# Patient Record
(demographics unavailable — no encounter records)

---

## 2025-02-25 NOTE — ASSESSMENT
[FreeTextEntry1] : 1) Onychomycosis with elongated ingrown toenails with pain: aseptic debridement of elongated mycotic ingrown toenails x 2, continue topical antifungal 2) Hammertoes: discussed use of proper fit high toe box shoes with accommodative insoles, check feet for any new lesions daily, seek treatment immediately if present  Patient tolerated all treatments well and without any complications [Verbal] : verbal [Patient] : patient [Good - alert, interested, motivated] : Good - alert, interested, motivated [Verbalizes knowledge/Understanding] : Verbalizes knowledge/understanding [Pt responsibility to plan of care] : patient responsibility to plan of care

## 2025-02-25 NOTE — PHYSICAL EXAM
[General Appearance - Alert] : alert [General Appearance - In No Acute Distress] : in no acute distress [General Appearance - Well Nourished] : well nourished [General Appearance - Well Developed] : well developed [General Appearance - Well-Appearing] : healthy appearing [] : normal voice and communication [Delayed in the Right Toes] : capillary refills normal in right toes [Delayed in the Left Toes] : capillary refills normal in the left toes [2+] : left foot dorsalis pedis 2+ [Skin Turgor] : normal skin turgor [Foot Ulcer] : no foot ulcer [Sensation] : the sensory exam was normal to light touch and pinprick [No Focal Deficits] : no focal deficits [Deep Tendon Reflexes (DTR)] : deep tendon reflexes were 2+ and symmetric [Motor Exam] : the motor exam was normal [Oriented To Time, Place, And Person] : oriented to person, place, and time [Impaired Insight] : insight and judgment were intact [Affect] : the affect was normal

## 2025-02-25 NOTE — PROCEDURE
[FreeTextEntry1] : 1) Onychomycosis with elongated ingrown toenails with pain: aseptic debridement of elongated mycotic ingrown toenails x 2  Patient tolerated all treatments well and without any complications

## 2025-02-25 NOTE — HISTORY OF PRESENT ILLNESS
[FreeTextEntry1] : Ms. YING JUNG is a 42 year F who is seen in the office for ingrown mycotic toenails, hammertoes. Patient denies any current or recent f/c/n/v/sob/chest pain. AAOx3 and in NAD.

## 2025-05-06 NOTE — HISTORY OF PRESENT ILLNESS
[de-identified] : Pt is s/p right shoulder arthroscopy Doing well. Pain controlled  NAD Right shoulder Incisions healed Mild diffuse TTP Compartments soft and NT ROM limited secondary to pain NVI  s/p right shoulder arthroscopy went over the surgery in detail will start pt, protocol provided continue pain control as needed fu in 1 month all questions answered

## 2025-05-16 NOTE — PHYSICAL EXAM
[General Appearance - Alert] : alert [General Appearance - In No Acute Distress] : in no acute distress [General Appearance - Well Nourished] : well nourished [General Appearance - Well Developed] : well developed [General Appearance - Well-Appearing] : healthy appearing [Delayed in the Right Toes] : capillary refills normal in right toes [Delayed in the Left Toes] : capillary refills normal in the left toes [2+] : left foot dorsalis pedis 2+ [] : normal strength/tone [de-identified] : ROM of ankle, subtalar, midtarsal, MPJ, IPJ are adequate bilateral 5/5 muscle power in inversion, eversion, dorsiflexion, plantarflexion bilateral [Skin Turgor] : normal skin turgor [Foot Ulcer] : no foot ulcer [FreeTextEntry1] : Dry skin to bilateral lower extremities No cellulitis, no fluctuance, no ecchymosis, mild edema  [Sensation] : the sensory exam was normal to light touch and pinprick [No Focal Deficits] : no focal deficits [Deep Tendon Reflexes (DTR)] : deep tendon reflexes were 2+ and symmetric [Motor Exam] : the motor exam was normal [Oriented To Time, Place, And Person] : oriented to person, place, and time [Impaired Insight] : insight and judgment were intact [Affect] : the affect was normal

## 2025-05-16 NOTE — ASSESSMENT
[FreeTextEntry1] : 1) Left hallux ingrown toenail: utilizing ethyl chloride topical anesthesia, utilizing sterile surgical instrumentation, a partial nail avulsion was performed, applied bacitracin 2) Onychomycosis: stable, Rx topical tavaborole antifungal solution daily 3) Hammertoes: recommend use of proper fit high toe box shoes with accommodative insoles, check feet for any new lesions daily, seek treatment immediately if present  Patient tolerated all treatments well and without any complications Follow up in 2 months [Verbal] : verbal [Patient] : patient [Good - alert, interested, motivated] : Good - alert, interested, motivated [Verbalizes knowledge/Understanding] : Verbalizes knowledge/understanding [Pt responsibility to plan of care] : patient responsibility to plan of care

## 2025-05-16 NOTE — REASON FOR VISIT
[Follow-Up Visit] : a follow-up visit for [FreeTextEntry2] : hammertoes, onychomycosis, Left hallux ingrown toenail

## 2025-05-16 NOTE — HISTORY OF PRESENT ILLNESS
[FreeTextEntry1] : Ms. YING JUNG is a 42 year female who is seen in the office for hammertoes, onychomycosis, Left hallux ingrown toenail. Patient denies any current or recent fever, chills, nausea, vomiting, SOB, or chest pain. AAOx3 and in NAD.

## 2025-05-16 NOTE — REVIEW OF SYSTEMS
[Joint Swelling] : no joint swelling [Joint Stiffness] : no joint stiffness [Limb Swelling] : no limb swelling [Skin Lesions] : no skin lesions [Skin Wound] : no skin wound [Itching] : no itching [Confused] : no confusion [Convulsions] : no convulsions [Dizziness] : no dizziness [Fainting] : no fainting [Negative] : Psychiatric

## 2025-05-16 NOTE — PROCEDURE
[FreeTextEntry1] : 1) Left hallux ingrown toenail: utilizing ethyl chloride topical anesthesia, utilizing sterile surgical instrumentation, a partial nail avulsion was performed, applied bacitracin  Patient tolerated all treatments well and without any complications

## 2025-07-23 NOTE — PHYSICAL EXAM
[General Appearance - Alert] : alert [General Appearance - In No Acute Distress] : in no acute distress [General Appearance - Well Nourished] : well nourished [General Appearance - Well Developed] : well developed [General Appearance - Well-Appearing] : healthy appearing [Delayed in the Right Toes] : capillary refills normal in right toes [Delayed in the Left Toes] : capillary refills normal in the left toes [2+] : left foot dorsalis pedis 2+ [] : normal strength/tone [de-identified] : ROM of ankle, subtalar, midtarsal, MPJ, IPJ are adequate bilateral 5/5 muscle power in inversion, eversion, dorsiflexion, plantarflexion bilateral [Skin Turgor] : normal skin turgor [Foot Ulcer] : no foot ulcer [FreeTextEntry1] : Dry skin to bilateral lower extremities No cellulitis, no fluctuance, no ecchymosis, mild edema  [Sensation] : the sensory exam was normal to light touch and pinprick [No Focal Deficits] : no focal deficits [Deep Tendon Reflexes (DTR)] : deep tendon reflexes were 2+ and symmetric [Motor Exam] : the motor exam was normal [Oriented To Time, Place, And Person] : oriented to person, place, and time [Impaired Insight] : insight and judgment were intact [Affect] : the affect was normal

## 2025-07-23 NOTE — REASON FOR VISIT
----- Message from Ashley sent at 2/25/2025  2:49 PM CST -----  Contact: Patient [phone]  [email or web address] Patient is calling for Medical Advice regarding:patient wants PCP to know she is going to [facility] on 3/8/25 for LABS and will see her on 3/7How long has patient had these symptoms:Pharmacy name and phone#:[facility] a call back or [facility]:Comments:Please advise patient replies from provider may take up to 48 hours.  
WILLI Pt was notified blood work was ordered and sent to [facility].Pt stated she not able to get blood work done until the day after visit on 3/7/25.  
[FreeTextEntry2] : [place], onychomycosis

## 2025-07-23 NOTE — HISTORY OF PRESENT ILLNESS
[FreeTextEntry1] : Ms. YING JUNG is a 42 year female who is seen in the office for hammertoes, onychomycosis. Patient denies any current or recent fever, chills, nausea, vomiting, SOB, or chest pain. AAOx3 and in NAD.

## 2025-07-23 NOTE — ASSESSMENT
[FreeTextEntry1] : 1) Onychomycosis: continue topical tavaborole solution daily 2) Hammertoes: recommend use of proper fit high toe box shoes with accommodative insoles, check feet for any new lesions daily, seek treatment immediately if present  Follow up in 2 months  [Verbal] : verbal [Patient] : patient [Good - alert, interested, motivated] : Good - alert, interested, motivated [Verbalizes knowledge/Understanding] : Verbalizes knowledge/understanding [Pt responsibility to plan of care] : patient responsibility to plan of care

## 2025-07-30 NOTE — DISCUSSION/SUMMARY
[de-identified] : Right shoulder pain follow-up  HPI Patient is a 42-year-old female reports to the office for subsequent evaluation of her right shoulder pain.  She had a right shoulder arthroscopy/extensive debridement/subacromial decompression done on 4/28/2025 by Dr. Shipley.  She has been undergoing formal physical therapy which has given her some relief.  She still admits to shoulder stiffness and pain occasionally.  She is right-hand dominant.  Denies any numbness or tingling.  Right shoulder exam is as follows: No swelling noted.  No erythema or ecchymosis.  Well-healed incision sites.  Active forward flexion/abduction to approximate 120 degrees, passive forward flexion/abduction to approximate 150 degrees with stiffness.  Internal rotation to approximately L5 and external rotation to approximately 85 degrees with stiffness.  There is decent strength with discomfort.  Light touch intact throughout.  Neurovascular intact.  Assessment/plan Patient is still slightly stiff on her shoulder examination.  Advised to continue formal physical therapy as directed a new prescription was provided for her.  The shoulder conditioning program from the [facility] was given to the patient so they may try that at home.  Mobic 15 mg PO QD PRN was sent to patient's pharmacy to help alleviate their symptoms.  The patient was advised to rest/ice the area and may alternate with warm compresses as needed.  Follow-up in 3 months.  May consider injection at next visit if still symptomatic.  All questions/concerns were answered in detail.